# Patient Record
Sex: MALE
[De-identification: names, ages, dates, MRNs, and addresses within clinical notes are randomized per-mention and may not be internally consistent; named-entity substitution may affect disease eponyms.]

---

## 2019-06-23 PROBLEM — Z00.00 ENCOUNTER FOR PREVENTIVE HEALTH EXAMINATION: Status: ACTIVE | Noted: 2019-06-23

## 2019-07-23 ENCOUNTER — APPOINTMENT (OUTPATIENT)
Dept: GASTROENTEROLOGY | Facility: CLINIC | Age: 49
End: 2019-07-23
Payer: COMMERCIAL

## 2019-07-23 VITALS
HEART RATE: 65 BPM | HEIGHT: 75 IN | BODY MASS INDEX: 22.5 KG/M2 | SYSTOLIC BLOOD PRESSURE: 115 MMHG | DIASTOLIC BLOOD PRESSURE: 62 MMHG | WEIGHT: 181 LBS

## 2019-07-23 DIAGNOSIS — E78.00 PURE HYPERCHOLESTEROLEMIA, UNSPECIFIED: ICD-10-CM

## 2019-07-23 DIAGNOSIS — Z12.11 ENCOUNTER FOR SCREENING FOR MALIGNANT NEOPLASM OF COLON: ICD-10-CM

## 2019-07-23 PROCEDURE — 99203 OFFICE O/P NEW LOW 30 MIN: CPT

## 2019-07-23 RX ORDER — POLYETHYLENE GLYCOL 3350 AND ELECTROLYTES WITH LEMON FLAVOR 236; 22.74; 6.74; 5.86; 2.97 G/4L; G/4L; G/4L; G/4L; G/4L
236 POWDER, FOR SOLUTION ORAL
Qty: 1 | Refills: 0 | Status: ACTIVE | COMMUNITY
Start: 2019-07-23 | End: 1900-01-01

## 2019-07-23 RX ORDER — ATORVASTATIN CALCIUM 20 MG/1
20 TABLET, FILM COATED ORAL DAILY
Refills: 0 | Status: ACTIVE | COMMUNITY

## 2019-07-23 NOTE — HISTORY OF PRESENT ILLNESS
[Vomiting] : denies vomiting [Diarrhea] : denies diarrhea [Nausea] : denies nausea [Heartburn] : denies heartburn [Constipation] : denies constipation [Yellow Skin Or Eyes (Jaundice)] : denies jaundice [Abdominal Pain] : denies abdominal pain [Rectal Pain] : denies rectal pain [Abdominal Swelling] : denies abdominal swelling [de-identified] : Shon presents to the office today for evaluation for colon cancer screening.  \par \par He feels well from a GI perspective, and denies any dysphagia, nausea, abdominal pain, change in bowel habits, GI bleeding, weight loss, or family history of colon cancer.  He normally moves his bowels once a day.  He has never had a colonoscopy before.  He has a history of hypercholesteremia but is otherwise healthy.\par

## 2019-07-23 NOTE — CONSULT LETTER
[Dear  ___] : Dear  [unfilled], [Consult Letter:] : I had the pleasure of evaluating your patient, [unfilled]. [Sincerely,] : Sincerely, [Please see my note below.] : Please see my note below. [Consult Closing:] : Thank you very much for allowing me to participate in the care of this patient.  If you have any questions, please do not hesitate to contact me. [FreeTextEntry3] : Bruce Jamison MD\par Department of Gastroenterology\par [facility]\par [address] [place] [address], [facility] [address]\[place] [address]\par Tel: [phone]\par Email: [email or web address]

## 2019-07-23 NOTE — ASSESSMENT
[FreeTextEntry1] : 1.  Encounter for colon cancer screening in average risk patient.  No previous colonoscopy.\par 2.  Hypercholesterolemia.\par \par Recs:\par - Obtain prior labs for review.\par - Patient was advised to undergo colonoscopy- procedure, risks, benefits, and alternatives were explained. Patient agreeable. Brochure given. PEG prep.\par

## 2019-07-29 ENCOUNTER — APPOINTMENT (OUTPATIENT)
Dept: GASTROENTEROLOGY | Facility: CLINIC | Age: 49
End: 2019-07-29
Payer: COMMERCIAL

## 2019-07-29 PROCEDURE — 45378 DIAGNOSTIC COLONOSCOPY: CPT

## 2020-12-21 PROBLEM — Z12.11 ENCOUNTER FOR SCREENING FOR MALIGNANT NEOPLASM OF COLON: Status: RESOLVED | Noted: 2019-07-23 | Resolved: 2020-12-21
